# Patient Record
Sex: FEMALE | URBAN - METROPOLITAN AREA
[De-identification: names, ages, dates, MRNs, and addresses within clinical notes are randomized per-mention and may not be internally consistent; named-entity substitution may affect disease eponyms.]

---

## 2023-03-06 ENCOUNTER — OFFICE VISIT (OUTPATIENT)
Dept: URBAN - METROPOLITAN AREA CLINIC 107 | Facility: CLINIC | Age: 68
End: 2023-03-06
Payer: MEDICARE

## 2023-03-06 VITALS
BODY MASS INDEX: 23.84 KG/M2 | HEART RATE: 72 BPM | WEIGHT: 106 LBS | TEMPERATURE: 97.6 F | SYSTOLIC BLOOD PRESSURE: 134 MMHG | DIASTOLIC BLOOD PRESSURE: 96 MMHG | RESPIRATION RATE: 18 BRPM | HEIGHT: 56 IN

## 2023-03-06 DIAGNOSIS — R11.14 BILIOUS VOMITING WITH NAUSEA: ICD-10-CM

## 2023-03-06 DIAGNOSIS — K21.9 GASTROESOPHAGEAL REFLUX DISEASE WITHOUT ESOPHAGITIS: ICD-10-CM

## 2023-03-06 PROCEDURE — 99204 OFFICE O/P NEW MOD 45 MIN: CPT | Performed by: INTERNAL MEDICINE

## 2023-03-06 PROCEDURE — 99244 OFF/OP CNSLTJ NEW/EST MOD 40: CPT | Performed by: INTERNAL MEDICINE

## 2023-03-06 RX ORDER — LOSARTAN POTASSIUM 50 MG/1
1 TABLET TABLET ORAL ONCE A DAY
Status: ACTIVE | COMMUNITY

## 2023-03-06 RX ORDER — ZOLPIDEM TARTRATE 5 MG/1
1 TABLET AT BEDTIME TABLET, FILM COATED ORAL ONCE A DAY
Status: ACTIVE | COMMUNITY

## 2023-03-06 RX ORDER — RABEPRAZOLE SODIUM 20 MG/1
1 TABLET TABLET, DELAYED RELEASE ORAL ONCE A DAY
Qty: 90 | Refills: 0 | OUTPATIENT
Start: 2023-03-06

## 2023-03-06 RX ORDER — ROSUVASTATIN CALCIUM 10 MG/1
1 TABLET TABLET, FILM COATED ORAL ONCE A DAY
Status: ACTIVE | COMMUNITY

## 2023-03-07 PROBLEM — 266435005: Status: ACTIVE | Noted: 2023-03-06

## 2023-03-09 ENCOUNTER — CLAIMS CREATED FROM THE CLAIM WINDOW (OUTPATIENT)
Dept: URBAN - METROPOLITAN AREA CLINIC 4 | Facility: CLINIC | Age: 68
End: 2023-03-09
Payer: MEDICARE

## 2023-03-09 ENCOUNTER — OFFICE VISIT (OUTPATIENT)
Dept: URBAN - METROPOLITAN AREA SURGERY CENTER 25 | Facility: SURGERY CENTER | Age: 68
End: 2023-03-09
Payer: MEDICARE

## 2023-03-09 DIAGNOSIS — K29.40 CHRONIC ATROPHIC GASTRITIS WITHOUT BLEEDING: ICD-10-CM

## 2023-03-09 DIAGNOSIS — K29.70 CHRONIC GASTRITIS: ICD-10-CM

## 2023-03-09 DIAGNOSIS — K31.A15 GASTRIC INTESTINAL METAPLASIA WITHOUT DYSPLASIA, INVOLVING MULTIPLE SITES: ICD-10-CM

## 2023-03-09 PROCEDURE — 88305 TISSUE EXAM BY PATHOLOGIST: CPT | Performed by: PATHOLOGY

## 2023-03-09 PROCEDURE — G8907 PT DOC NO EVENTS ON DISCHARG: HCPCS | Performed by: INTERNAL MEDICINE

## 2023-03-09 PROCEDURE — 43239 EGD BIOPSY SINGLE/MULTIPLE: CPT | Performed by: INTERNAL MEDICINE

## 2023-03-09 PROCEDURE — 88342 IMHCHEM/IMCYTCHM 1ST ANTB: CPT | Performed by: PATHOLOGY

## 2023-03-09 RX ORDER — RABEPRAZOLE SODIUM 20 MG/1
1 TABLET TABLET, DELAYED RELEASE ORAL ONCE A DAY
Qty: 90 | Refills: 0 | Status: ACTIVE | COMMUNITY
Start: 2023-03-06

## 2023-03-09 RX ORDER — ROSUVASTATIN CALCIUM 10 MG/1
1 TABLET TABLET, FILM COATED ORAL ONCE A DAY
Status: ACTIVE | COMMUNITY

## 2023-03-09 RX ORDER — LOSARTAN POTASSIUM 50 MG/1
1 TABLET TABLET ORAL ONCE A DAY
Status: ACTIVE | COMMUNITY

## 2023-03-09 RX ORDER — ZOLPIDEM TARTRATE 5 MG/1
1 TABLET AT BEDTIME TABLET, FILM COATED ORAL ONCE A DAY
Status: ACTIVE | COMMUNITY

## 2023-03-20 PROBLEM — 8493009 CHRONIC GASTRITIS: Status: ACTIVE | Noted: 2023-03-20

## 2023-04-13 ENCOUNTER — OFFICE VISIT (OUTPATIENT)
Dept: URBAN - METROPOLITAN AREA CLINIC 113 | Facility: CLINIC | Age: 68
End: 2023-04-13

## 2023-04-13 NOTE — HPI-TODAY'S VISIT:
68-year-old female presents for follow-up.  She was last seen on 3/6/2023.  She reported a history of gastritis in the past.  Pantoprazole and Zantac were ineffective.  She was started on rabeprazole 20 mg once daily and plan for EGD. EGD 3/9/2023:Performed without difficulty.  Normal esophagus and duodenum.  Gastritis noted and biopsied.  Pathology revealed chronic inactive gastritis with focal intestinal metaplasia and histological changes suggestive of treated H. pylori gastritis.  Negative for H. pylori, dysplasia or malignancy.  Repeat EGD as needed for surveillance.  3/6/2023 69 y/o female referred by Dr. Augusto Saunders for a hx of gastric ulcer. She has had epigastric pain and discomfort for the past year. She has a hx of EGD in January 2011 and had gastritis in the antrum and body. She also had a CSC done and was found to have grade 2 hemorrhoids. She was recommended to f/u in 5 yrs.

## 2023-05-19 ENCOUNTER — CLAIMS CREATED FROM THE CLAIM WINDOW (OUTPATIENT)
Dept: URBAN - METROPOLITAN AREA CLINIC 107 | Facility: CLINIC | Age: 68
End: 2023-05-19
Payer: MEDICARE

## 2023-05-19 VITALS
DIASTOLIC BLOOD PRESSURE: 77 MMHG | HEART RATE: 75 BPM | SYSTOLIC BLOOD PRESSURE: 115 MMHG | RESPIRATION RATE: 16 BRPM | HEIGHT: 56 IN | BODY MASS INDEX: 24.3 KG/M2 | WEIGHT: 108 LBS | TEMPERATURE: 97 F

## 2023-05-19 DIAGNOSIS — K21.9 GASTROESOPHAGEAL REFLUX DISEASE WITHOUT ESOPHAGITIS: ICD-10-CM

## 2023-05-19 DIAGNOSIS — Z86.19 HISTORY OF HELICOBACTER PYLORI INFECTION: ICD-10-CM

## 2023-05-19 DIAGNOSIS — R11.14 BILIOUS VOMITING WITH NAUSEA: ICD-10-CM

## 2023-05-19 PROCEDURE — 99214 OFFICE O/P EST MOD 30 MIN: CPT

## 2023-05-19 RX ORDER — RABEPRAZOLE SODIUM 20 MG/1
1 TABLET TABLET, DELAYED RELEASE ORAL ONCE A DAY
Qty: 90 | Refills: 0 | Status: ACTIVE | COMMUNITY
Start: 2023-03-06

## 2023-05-19 RX ORDER — LOSARTAN POTASSIUM 50 MG/1
1 TABLET TABLET ORAL ONCE A DAY
Status: ACTIVE | COMMUNITY

## 2023-05-19 RX ORDER — ROSUVASTATIN CALCIUM 10 MG/1
1 TABLET TABLET, FILM COATED ORAL ONCE A DAY
Status: ACTIVE | COMMUNITY

## 2023-05-19 RX ORDER — ZOLPIDEM TARTRATE 5 MG/1
1 TABLET AT BEDTIME TABLET, FILM COATED ORAL ONCE A DAY
Status: ACTIVE | COMMUNITY

## 2023-05-19 NOTE — HPI-TODAY'S VISIT:
68-year-old female presents for follow-up.  She was last seen on 3/6/2023.  She reported a history of gastritis in the past.  Pantoprazole and Zantac were ineffective.  She was started on rabeprazole 20 mg once daily and plan for EGD. EGD 3/9/2023:Performed without difficulty.  Normal esophagus and duodenum.  Gastritis noted and biopsied.  Pathology revealed chronic inactive gastritis with focal intestinal metaplasia and histological changes suggestive of treated H. pylori gastritis.  Negative for H. pylori, dysplasia or malignancy.  Repeat EGD as needed for surveillance.  She stopped the Aciphex two weeks ago as it was not working. She states the epigastric pain is intermittent but severe. Certain foods might trigger it like fatty foods. She does not have a gallbladder. Bowel movements are regular. Denies nausea or vomiting. She has had H pylori in the past. This was treated. Her last colonoscopy was 3 years ago. She is due in two years.  3/6/2023 67 y/o female referred by Dr. Augusto Saunders for a hx of gastric ulcer. She has had epigastric pain and discomfort for the past year. She has a hx of EGD in January 2011 and had gastritis in the antrum and body. She also had a CSC done and was found to have grade 2 hemorrhoids. She was recommended to f/u in 5 yrs.

## 2023-06-01 ENCOUNTER — TELEPHONE ENCOUNTER (OUTPATIENT)
Dept: URBAN - METROPOLITAN AREA CLINIC 113 | Facility: CLINIC | Age: 68
End: 2023-06-01

## 2023-06-01 RX ORDER — RABEPRAZOLE SODIUM 20 MG/1
1 TABLET TABLET, DELAYED RELEASE ORAL ONCE A DAY
Qty: 90 | Refills: 0
Start: 2023-03-06

## 2023-06-14 ENCOUNTER — ERX REFILL RESPONSE (OUTPATIENT)
Dept: URBAN - METROPOLITAN AREA CLINIC 107 | Facility: CLINIC | Age: 68
End: 2023-06-14

## 2023-06-14 RX ORDER — RABEPRAZOLE SODIUM 20 MG/1
TAKE ONE TABLET BY MOUTH ONE TIME DAILY FOR 30 DAYS TABLET, DELAYED RELEASE ORAL
Qty: 90 TABLET | Refills: 0 | OUTPATIENT

## 2023-06-14 RX ORDER — RABEPRAZOLE SODIUM 20 MG/1
TAKE ONE TABLET BY MOUTH ONE TIME DAILY FOR 30 DAYS TABLET, DELAYED RELEASE ORAL
Qty: 90 TABLET | Refills: 1 | OUTPATIENT

## 2023-06-14 RX ORDER — RABEPRAZOLE SODIUM 20 MG/1
1 TABLET TABLET, DELAYED RELEASE ORAL ONCE A DAY
Qty: 90 | Refills: 0 | OUTPATIENT

## 2023-07-14 ENCOUNTER — OFFICE VISIT (OUTPATIENT)
Dept: URBAN - METROPOLITAN AREA CLINIC 107 | Facility: CLINIC | Age: 68
End: 2023-07-14

## 2023-09-07 ENCOUNTER — OFFICE VISIT (OUTPATIENT)
Dept: URBAN - METROPOLITAN AREA CLINIC 107 | Facility: CLINIC | Age: 68
End: 2023-09-07
Payer: MEDICARE

## 2023-09-07 VITALS
HEART RATE: 69 BPM | BODY MASS INDEX: 24.52 KG/M2 | TEMPERATURE: 97.3 F | RESPIRATION RATE: 16 BRPM | WEIGHT: 109 LBS | SYSTOLIC BLOOD PRESSURE: 126 MMHG | DIASTOLIC BLOOD PRESSURE: 83 MMHG | HEIGHT: 56 IN

## 2023-09-07 DIAGNOSIS — R11.14 BILIOUS VOMITING WITH NAUSEA: ICD-10-CM

## 2023-09-07 DIAGNOSIS — K21.9 GASTROESOPHAGEAL REFLUX DISEASE WITHOUT ESOPHAGITIS: ICD-10-CM

## 2023-09-07 DIAGNOSIS — R10.13 EPIGASTRIC PAIN: ICD-10-CM

## 2023-09-07 DIAGNOSIS — Z86.19 HISTORY OF HELICOBACTER PYLORI INFECTION: ICD-10-CM

## 2023-09-07 PROCEDURE — 99214 OFFICE O/P EST MOD 30 MIN: CPT | Performed by: INTERNAL MEDICINE

## 2023-09-07 RX ORDER — RABEPRAZOLE SODIUM 20 MG/1
TAKE ONE TABLET BY MOUTH ONE TIME DAILY FOR 30 DAYS TABLET, DELAYED RELEASE ORAL
Qty: 90 TABLET | Refills: 0 | Status: ACTIVE | COMMUNITY

## 2023-09-07 RX ORDER — LANSOPRAZOLE 30 MG/1
1 CAPSULE BEFORE A MEAL CAPSULE, DELAYED RELEASE ORAL ONCE A DAY
Qty: 90 CAPSULE | Refills: 3 | OUTPATIENT
Start: 2023-09-07

## 2023-09-07 RX ORDER — ROSUVASTATIN CALCIUM 10 MG/1
1 TABLET TABLET, FILM COATED ORAL ONCE A DAY
Status: ACTIVE | COMMUNITY

## 2023-09-07 RX ORDER — LOSARTAN POTASSIUM 50 MG/1
1 TABLET TABLET ORAL ONCE A DAY
Status: ACTIVE | COMMUNITY

## 2023-09-07 RX ORDER — ZOLPIDEM TARTRATE 5 MG/1
1 TABLET AT BEDTIME TABLET, FILM COATED ORAL ONCE A DAY
Status: ACTIVE | COMMUNITY

## 2023-09-07 NOTE — HPI-TODAY'S VISIT:
69 y/o female presenting for f/u. She was last seen in May 2023. She has a hx of gastritis and was on Raberprazole and had an EGD done in March 2023. She was found to have gastritis suggestive of treated H.Pylori. She was treated for H.Pylori in the past and breath test was negative in May 2023. For the last few weeks she has had nausea, abdominal pain, and decreased appetite. She does have reflux but feel like the medication does not help.   5/19/23 68-year-old female presents for follow-up.  She was last seen on 3/6/2023.  She reported a history of gastritis in the past.  Pantoprazole and Zantac were ineffective.  She was started on rabeprazole 20 mg once daily and plan for EGD. EGD 3/9/2023:Performed without difficulty.  Normal esophagus and duodenum.  Gastritis noted and biopsied.  Pathology revealed chronic inactive gastritis with focal intestinal metaplasia and histological changes suggestive of treated H. pylori gastritis.  Negative for H. pylori, dysplasia or malignancy.  Repeat EGD as needed for surveillance.  She stopped the Aciphex two weeks ago as it was not working. She states the epigastric pain is intermittent but severe. Certain foods might trigger it like fatty foods. She does not have a gallbladder. Bowel movements are regular. Denies nausea or vomiting. She has had H pylori in the past. This was treated. Her last colonoscopy was 3 years ago. She is due in two years.  3/6/2023 69 y/o female referred by Dr. Augusto Saunders for a hx of gastric ulcer. She has had epigastric pain and discomfort for the past year. She has a hx of EGD in January 2011 and had gastritis in the antrum and body. She also had a CSC done and was found to have grade 2 hemorrhoids. She was recommended to f/u in 5 yrs.

## 2023-11-26 ENCOUNTER — LAB OUTSIDE AN ENCOUNTER (OUTPATIENT)
Dept: URBAN - METROPOLITAN AREA CLINIC 113 | Facility: CLINIC | Age: 68
End: 2023-11-26

## 2023-11-26 ENCOUNTER — TELEPHONE ENCOUNTER (OUTPATIENT)
Dept: URBAN - METROPOLITAN AREA CLINIC 113 | Facility: CLINIC | Age: 68
End: 2023-11-26

## 2024-01-23 ENCOUNTER — OFFICE VISIT (OUTPATIENT)
Dept: URBAN - METROPOLITAN AREA CLINIC 107 | Facility: CLINIC | Age: 69
End: 2024-01-23
Payer: MEDICARE

## 2024-01-23 ENCOUNTER — TELEPHONE ENCOUNTER (OUTPATIENT)
Dept: URBAN - METROPOLITAN AREA CLINIC 113 | Facility: CLINIC | Age: 69
End: 2024-01-23

## 2024-01-23 ENCOUNTER — DASHBOARD ENCOUNTERS (OUTPATIENT)
Age: 69
End: 2024-01-23

## 2024-01-23 VITALS
TEMPERATURE: 97.1 F | DIASTOLIC BLOOD PRESSURE: 84 MMHG | HEIGHT: 56 IN | BODY MASS INDEX: 24.39 KG/M2 | WEIGHT: 108.4 LBS | HEART RATE: 66 BPM | SYSTOLIC BLOOD PRESSURE: 125 MMHG

## 2024-01-23 DIAGNOSIS — K21.9 GASTROESOPHAGEAL REFLUX DISEASE WITHOUT ESOPHAGITIS: ICD-10-CM

## 2024-01-23 DIAGNOSIS — Z86.19 HISTORY OF HELICOBACTER PYLORI INFECTION: ICD-10-CM

## 2024-01-23 DIAGNOSIS — R11.14 BILIOUS VOMITING WITH NAUSEA: ICD-10-CM

## 2024-01-23 PROCEDURE — 99213 OFFICE O/P EST LOW 20 MIN: CPT | Performed by: INTERNAL MEDICINE

## 2024-01-23 RX ORDER — LOSARTAN POTASSIUM 50 MG/1
1 TABLET TABLET ORAL ONCE A DAY
Status: ACTIVE | COMMUNITY

## 2024-01-23 RX ORDER — RABEPRAZOLE SODIUM 20 MG/1
TAKE ONE TABLET BY MOUTH ONE TIME DAILY FOR 30 DAYS TABLET, DELAYED RELEASE ORAL
Qty: 90 TABLET | Refills: 0 | Status: ACTIVE | COMMUNITY

## 2024-01-23 RX ORDER — LANSOPRAZOLE 30 MG/1
1 CAPSULE BEFORE A MEAL CAPSULE, DELAYED RELEASE ORAL ONCE A DAY
Qty: 90 CAPSULE | Refills: 3 | Status: ACTIVE | COMMUNITY
Start: 2023-09-07

## 2024-01-23 RX ORDER — ROSUVASTATIN CALCIUM 10 MG/1
1 TABLET TABLET, FILM COATED ORAL ONCE A DAY
Status: ON HOLD | COMMUNITY

## 2024-01-23 RX ORDER — ZOLPIDEM TARTRATE 5 MG/1
1 TABLET AT BEDTIME TABLET, FILM COATED ORAL ONCE A DAY
Status: ACTIVE | COMMUNITY

## 2024-01-23 RX ORDER — IBANDRONATE SODIUM 150 MG/1
1 TABLET 60 MINUTES BEFORE THE FIRST FOOD, BEVERAGE OR MEDICINE OF THE DAY WITH PLAIN WATER TABLET, FILM COATED ORAL
Status: ACTIVE | COMMUNITY

## 2024-01-23 NOTE — HPI-TODAY'S VISIT:
69 y/o female presenting for f/u. She was last seen in September 2023. She has a hx of H.Pylori s/p Tx. She had an EGD in March 2023. She also has had abdominal pain and decreased appetite in September 2023. She had an MRI done in Dec 2023. She was found to have a 1.8cm exophytic lesion in the posterior pole of the kidney; likely a hemorrhagic cyst or old hematoma.   She did have a kidney biopsy in August 2020. She was told that the path was a cyst. She has not been seen back by Urology since then.   She feels like she has a "lump" in the RUQ/R flank.   9/2023 69 y/o female presenting for f/u. She was last seen in May 2023. She has a hx of gastritis and was on Raberprazole and had an EGD done in March 2023. She was found to have gastritis suggestive of treated H.Pylori. She was treated for H.Pylori in the past and breath test was negative in May 2023. For the last few weeks she has had nausea, abdominal pain, and decreased appetite. She does have reflux but feel like the medication does not help.   5/19/23 68-year-old female presents for follow-up.  She was last seen on 3/6/2023.  She reported a history of gastritis in the past.  Pantoprazole and Zantac were ineffective.  She was started on rabeprazole 20 mg once daily and plan for EGD. EGD 3/9/2023:Performed without difficulty.  Normal esophagus and duodenum.  Gastritis noted and biopsied.  Pathology revealed chronic inactive gastritis with focal intestinal metaplasia and histological changes suggestive of treated H. pylori gastritis.  Negative for H. pylori, dysplasia or malignancy.  Repeat EGD as needed for surveillance.  She stopped the Aciphex two weeks ago as it was not working. She states the epigastric pain is intermittent but severe. Certain foods might trigger it like fatty foods. She does not have a gallbladder. Bowel movements are regular. Denies nausea or vomiting. She has had H pylori in the past. This was treated. Her last colonoscopy was 3 years ago. She is due in two years.  3/6/2023 69 y/o female referred by Dr. Augusto Saunders for a hx of gastric ulcer. She has had epigastric pain and discomfort for the past year. She has a hx of EGD in January 2011 and had gastritis in the antrum and body. She also had a CSC done and was found to have grade 2 hemorrhoids. She was recommended to f/u in 5 yrs.